# Patient Record
Sex: FEMALE | Race: WHITE | Employment: FULL TIME | URBAN - METROPOLITAN AREA
[De-identification: names, ages, dates, MRNs, and addresses within clinical notes are randomized per-mention and may not be internally consistent; named-entity substitution may affect disease eponyms.]

---

## 2017-03-10 ENCOUNTER — OFFICE VISIT (OUTPATIENT)
Dept: FAMILY MEDICINE CLINIC | Age: 57
End: 2017-03-10

## 2017-03-10 VITALS
TEMPERATURE: 97.9 F | DIASTOLIC BLOOD PRESSURE: 72 MMHG | HEIGHT: 70 IN | RESPIRATION RATE: 16 BRPM | BODY MASS INDEX: 28.63 KG/M2 | HEART RATE: 76 BPM | WEIGHT: 200 LBS | SYSTOLIC BLOOD PRESSURE: 118 MMHG

## 2017-03-10 DIAGNOSIS — J02.8 ACUTE PHARYNGITIS DUE TO OTHER SPECIFIED ORGANISMS: Primary | ICD-10-CM

## 2017-03-10 LAB — S PYO AG THROAT QL: NORMAL

## 2017-03-10 PROCEDURE — 87880 STREP A ASSAY W/OPTIC: CPT | Performed by: FAMILY MEDICINE

## 2017-03-10 PROCEDURE — 99213 OFFICE O/P EST LOW 20 MIN: CPT | Performed by: FAMILY MEDICINE

## 2017-03-10 RX ORDER — AZITHROMYCIN 250 MG/1
TABLET, FILM COATED ORAL
Qty: 1 PACKET | Refills: 0 | Status: SHIPPED | OUTPATIENT
Start: 2017-03-10 | End: 2017-03-20

## 2017-03-10 ASSESSMENT — ENCOUNTER SYMPTOMS
EYE ITCHING: 1
EYE DISCHARGE: 1
COUGH: 1
DIARRHEA: 0
SINUS PRESSURE: 1
SORE THROAT: 1
ABDOMINAL PAIN: 0

## 2017-07-31 DIAGNOSIS — K21.9 GASTROESOPHAGEAL REFLUX DISEASE WITHOUT ESOPHAGITIS: ICD-10-CM

## 2017-07-31 RX ORDER — OMEPRAZOLE 20 MG/1
CAPSULE, DELAYED RELEASE ORAL
Qty: 60 CAPSULE | Refills: 6 | Status: SHIPPED | OUTPATIENT
Start: 2017-07-31 | End: 2018-03-12 | Stop reason: SDUPTHER

## 2017-09-22 ENCOUNTER — OFFICE VISIT (OUTPATIENT)
Dept: FAMILY MEDICINE CLINIC | Age: 57
End: 2017-09-22

## 2017-09-22 VITALS
SYSTOLIC BLOOD PRESSURE: 122 MMHG | RESPIRATION RATE: 16 BRPM | HEIGHT: 70 IN | WEIGHT: 200.2 LBS | TEMPERATURE: 97.9 F | HEART RATE: 80 BPM | DIASTOLIC BLOOD PRESSURE: 74 MMHG | BODY MASS INDEX: 28.66 KG/M2

## 2017-09-22 DIAGNOSIS — L30.9 ECZEMA, UNSPECIFIED TYPE: ICD-10-CM

## 2017-09-22 DIAGNOSIS — K21.9 GASTROESOPHAGEAL REFLUX DISEASE WITHOUT ESOPHAGITIS: ICD-10-CM

## 2017-09-22 DIAGNOSIS — L01.00 IMPETIGO: ICD-10-CM

## 2017-09-22 DIAGNOSIS — Z12.31 VISIT FOR SCREENING MAMMOGRAM: ICD-10-CM

## 2017-09-22 DIAGNOSIS — Z00.00 ANNUAL PHYSICAL EXAM: Primary | ICD-10-CM

## 2017-09-22 DIAGNOSIS — Z00.00 ANNUAL PHYSICAL EXAM: ICD-10-CM

## 2017-09-22 DIAGNOSIS — F17.200 TOBACCO USE DISORDER: ICD-10-CM

## 2017-09-22 DIAGNOSIS — Z23 NEED FOR SHINGLES VACCINE: ICD-10-CM

## 2017-09-22 LAB
ALBUMIN SERPL-MCNC: 4.3 G/DL (ref 3.9–4.9)
ALP BLD-CCNC: 104 U/L (ref 40–130)
ALT SERPL-CCNC: 14 U/L (ref 0–33)
ANION GAP SERPL CALCULATED.3IONS-SCNC: 15 MEQ/L (ref 7–13)
AST SERPL-CCNC: 15 U/L (ref 0–35)
BASOPHILS ABSOLUTE: 0.1 K/UL (ref 0–0.2)
BASOPHILS RELATIVE PERCENT: 0.5 %
BILIRUB SERPL-MCNC: 0.4 MG/DL (ref 0–1.2)
BUN BLDV-MCNC: 18 MG/DL (ref 6–20)
CALCIUM SERPL-MCNC: 9.3 MG/DL (ref 8.6–10.2)
CHLORIDE BLD-SCNC: 101 MEQ/L (ref 98–107)
CHOLESTEROL, TOTAL: 203 MG/DL (ref 0–199)
CO2: 26 MEQ/L (ref 22–29)
CREAT SERPL-MCNC: 0.67 MG/DL (ref 0.5–0.9)
EOSINOPHILS ABSOLUTE: 0.1 K/UL (ref 0–0.7)
EOSINOPHILS RELATIVE PERCENT: 1.4 %
GFR AFRICAN AMERICAN: >60
GFR NON-AFRICAN AMERICAN: >60
GLOBULIN: 3.1 G/DL (ref 2.3–3.5)
GLUCOSE BLD-MCNC: 105 MG/DL (ref 74–109)
HCT VFR BLD CALC: 43.3 % (ref 37–47)
HDLC SERPL-MCNC: 55 MG/DL (ref 40–59)
HEMOGLOBIN: 14.3 G/DL (ref 12–16)
LDL CHOLESTEROL CALCULATED: 130 MG/DL (ref 0–129)
LYMPHOCYTES ABSOLUTE: 3.8 K/UL (ref 1–4.8)
LYMPHOCYTES RELATIVE PERCENT: 36.2 %
MCH RBC QN AUTO: 30.6 PG (ref 27–31.3)
MCHC RBC AUTO-ENTMCNC: 33.1 % (ref 33–37)
MCV RBC AUTO: 92.5 FL (ref 82–100)
MONOCYTES ABSOLUTE: 0.6 K/UL (ref 0.2–0.8)
MONOCYTES RELATIVE PERCENT: 5.8 %
NEUTROPHILS ABSOLUTE: 5.9 K/UL (ref 1.4–6.5)
NEUTROPHILS RELATIVE PERCENT: 56.1 %
PDW BLD-RTO: 14.1 % (ref 11.5–14.5)
PLATELET # BLD: 275 K/UL (ref 130–400)
POTASSIUM SERPL-SCNC: 4 MEQ/L (ref 3.5–5.1)
RBC # BLD: 4.68 M/UL (ref 4.2–5.4)
SODIUM BLD-SCNC: 142 MEQ/L (ref 132–144)
TOTAL PROTEIN: 7.4 G/DL (ref 6.4–8.1)
TRIGL SERPL-MCNC: 92 MG/DL (ref 0–200)
WBC # BLD: 10.4 K/UL (ref 4.8–10.8)

## 2017-09-22 PROCEDURE — 99396 PREV VISIT EST AGE 40-64: CPT | Performed by: FAMILY MEDICINE

## 2017-09-22 RX ORDER — TRIAMCINOLONE ACETONIDE 1 MG/G
CREAM TOPICAL
Qty: 45 G | Refills: 1 | Status: SHIPPED | OUTPATIENT
Start: 2017-09-22 | End: 2018-09-07 | Stop reason: ALTCHOICE

## 2017-09-22 ASSESSMENT — ENCOUNTER SYMPTOMS
DIARRHEA: 0
RHINORRHEA: 0
SHORTNESS OF BREATH: 0
WHEEZING: 0
VOMITING: 0
CHEST TIGHTNESS: 0
COUGH: 0
ABDOMINAL PAIN: 0
NAUSEA: 0
SORE THROAT: 0
SINUS PRESSURE: 0

## 2017-09-22 ASSESSMENT — PATIENT HEALTH QUESTIONNAIRE - PHQ9
SUM OF ALL RESPONSES TO PHQ QUESTIONS 1-9: 0
SUM OF ALL RESPONSES TO PHQ9 QUESTIONS 1 & 2: 0
2. FEELING DOWN, DEPRESSED OR HOPELESS: 0
1. LITTLE INTEREST OR PLEASURE IN DOING THINGS: 0

## 2017-09-27 ENCOUNTER — NURSE ONLY (OUTPATIENT)
Dept: FAMILY MEDICINE CLINIC | Age: 57
End: 2017-09-27

## 2017-09-27 DIAGNOSIS — Z23 NEED FOR SHINGLES VACCINE: Primary | ICD-10-CM

## 2017-09-27 PROCEDURE — 90471 IMMUNIZATION ADMIN: CPT | Performed by: FAMILY MEDICINE

## 2017-09-27 PROCEDURE — 90736 HZV VACCINE LIVE SUBQ: CPT | Performed by: FAMILY MEDICINE

## 2017-10-06 ENCOUNTER — HOSPITAL ENCOUNTER (OUTPATIENT)
Dept: WOMENS IMAGING | Age: 57
Discharge: HOME | End: 2017-10-06
Payer: COMMERCIAL

## 2017-10-06 DIAGNOSIS — Z12.31 VISIT FOR SCREENING MAMMOGRAM: ICD-10-CM

## 2017-10-06 PROCEDURE — G0202 SCR MAMMO BI INCL CAD: HCPCS

## 2018-03-12 DIAGNOSIS — K21.9 GASTROESOPHAGEAL REFLUX DISEASE WITHOUT ESOPHAGITIS: ICD-10-CM

## 2018-03-12 RX ORDER — OMEPRAZOLE 20 MG/1
20 CAPSULE, DELAYED RELEASE ORAL 2 TIMES DAILY
Qty: 60 CAPSULE | Refills: 0 | Status: SHIPPED | OUTPATIENT
Start: 2018-03-12 | End: 2018-04-13 | Stop reason: SDUPTHER

## 2018-04-13 DIAGNOSIS — K21.9 GASTROESOPHAGEAL REFLUX DISEASE WITHOUT ESOPHAGITIS: ICD-10-CM

## 2018-04-13 RX ORDER — OMEPRAZOLE 20 MG/1
20 CAPSULE, DELAYED RELEASE ORAL 2 TIMES DAILY
Qty: 60 CAPSULE | Refills: 2 | Status: SHIPPED | OUTPATIENT
Start: 2018-04-13 | End: 2018-06-13 | Stop reason: SDUPTHER

## 2018-05-18 DIAGNOSIS — R06.02 SOB (SHORTNESS OF BREATH) ON EXERTION: ICD-10-CM

## 2018-06-13 DIAGNOSIS — K21.9 GASTROESOPHAGEAL REFLUX DISEASE WITHOUT ESOPHAGITIS: ICD-10-CM

## 2018-06-13 RX ORDER — OMEPRAZOLE 20 MG/1
CAPSULE, DELAYED RELEASE ORAL
Qty: 60 CAPSULE | Refills: 0 | Status: SHIPPED | OUTPATIENT
Start: 2018-06-13 | End: 2018-08-13 | Stop reason: SDUPTHER

## 2018-08-13 DIAGNOSIS — K21.9 GASTROESOPHAGEAL REFLUX DISEASE WITHOUT ESOPHAGITIS: ICD-10-CM

## 2018-08-13 RX ORDER — OMEPRAZOLE 20 MG/1
CAPSULE, DELAYED RELEASE ORAL
Qty: 60 CAPSULE | Refills: 0 | Status: SHIPPED | OUTPATIENT
Start: 2018-08-13 | End: 2018-09-07 | Stop reason: SDUPTHER

## 2018-08-13 NOTE — TELEPHONE ENCOUNTER
Medication: Omeprazole    Last Office Visit: 09/22/17    Last Labs: 09/22/17    Last Filled: 06/13/18

## 2018-09-07 ENCOUNTER — OFFICE VISIT (OUTPATIENT)
Dept: FAMILY MEDICINE CLINIC | Age: 58
End: 2018-09-07
Payer: COMMERCIAL

## 2018-09-07 VITALS
RESPIRATION RATE: 16 BRPM | HEART RATE: 68 BPM | HEIGHT: 70 IN | TEMPERATURE: 97.1 F | WEIGHT: 206.2 LBS | SYSTOLIC BLOOD PRESSURE: 120 MMHG | BODY MASS INDEX: 29.52 KG/M2 | DIASTOLIC BLOOD PRESSURE: 80 MMHG

## 2018-09-07 DIAGNOSIS — Z00.00 ROUTINE GENERAL MEDICAL EXAMINATION AT A HEALTH CARE FACILITY: ICD-10-CM

## 2018-09-07 DIAGNOSIS — F17.200 TOBACCO USE DISORDER: ICD-10-CM

## 2018-09-07 DIAGNOSIS — K21.9 GASTROESOPHAGEAL REFLUX DISEASE WITHOUT ESOPHAGITIS: ICD-10-CM

## 2018-09-07 DIAGNOSIS — R06.02 SOB (SHORTNESS OF BREATH) ON EXERTION: ICD-10-CM

## 2018-09-07 DIAGNOSIS — Z00.00 ROUTINE GENERAL MEDICAL EXAMINATION AT A HEALTH CARE FACILITY: Primary | ICD-10-CM

## 2018-09-07 DIAGNOSIS — L82.1 SEBORRHEIC KERATOSES: ICD-10-CM

## 2018-09-07 DIAGNOSIS — Z12.31 VISIT FOR SCREENING MAMMOGRAM: ICD-10-CM

## 2018-09-07 LAB
ALBUMIN SERPL-MCNC: 4.1 G/DL (ref 3.9–4.9)
ALP BLD-CCNC: 101 U/L (ref 40–130)
ALT SERPL-CCNC: 18 U/L (ref 0–33)
ANION GAP SERPL CALCULATED.3IONS-SCNC: 14 MEQ/L (ref 7–13)
AST SERPL-CCNC: 20 U/L (ref 0–35)
BASOPHILS ABSOLUTE: 0 K/UL (ref 0–0.2)
BASOPHILS RELATIVE PERCENT: 0.5 %
BILIRUB SERPL-MCNC: 0.3 MG/DL (ref 0–1.2)
BUN BLDV-MCNC: 22 MG/DL (ref 6–20)
CALCIUM SERPL-MCNC: 9.4 MG/DL (ref 8.6–10.2)
CHLORIDE BLD-SCNC: 101 MEQ/L (ref 98–107)
CHOLESTEROL, TOTAL: 207 MG/DL (ref 0–199)
CO2: 24 MEQ/L (ref 22–29)
CREAT SERPL-MCNC: 0.67 MG/DL (ref 0.5–0.9)
EOSINOPHILS ABSOLUTE: 0.2 K/UL (ref 0–0.7)
EOSINOPHILS RELATIVE PERCENT: 1.6 %
GFR AFRICAN AMERICAN: >60
GFR NON-AFRICAN AMERICAN: >60
GLOBULIN: 3.5 G/DL (ref 2.3–3.5)
GLUCOSE BLD-MCNC: 97 MG/DL (ref 74–109)
HCT VFR BLD CALC: 43.4 % (ref 37–47)
HDLC SERPL-MCNC: 49 MG/DL (ref 40–59)
HEMOGLOBIN: 14.7 G/DL (ref 12–16)
LDL CHOLESTEROL CALCULATED: 140 MG/DL (ref 0–129)
LYMPHOCYTES ABSOLUTE: 3.5 K/UL (ref 1–4.8)
LYMPHOCYTES RELATIVE PERCENT: 33.4 %
MCH RBC QN AUTO: 31.6 PG (ref 27–31.3)
MCHC RBC AUTO-ENTMCNC: 33.9 % (ref 33–37)
MCV RBC AUTO: 93.4 FL (ref 82–100)
MONOCYTES ABSOLUTE: 0.6 K/UL (ref 0.2–0.8)
MONOCYTES RELATIVE PERCENT: 6 %
NEUTROPHILS ABSOLUTE: 6.2 K/UL (ref 1.4–6.5)
NEUTROPHILS RELATIVE PERCENT: 58.5 %
PDW BLD-RTO: 14 % (ref 11.5–14.5)
PLATELET # BLD: 256 K/UL (ref 130–400)
POTASSIUM SERPL-SCNC: 3.9 MEQ/L (ref 3.5–5.1)
RBC # BLD: 4.64 M/UL (ref 4.2–5.4)
SODIUM BLD-SCNC: 139 MEQ/L (ref 132–144)
TOTAL PROTEIN: 7.6 G/DL (ref 6.4–8.1)
TRIGL SERPL-MCNC: 91 MG/DL (ref 0–200)
WBC # BLD: 10.5 K/UL (ref 4.8–10.8)

## 2018-09-07 PROCEDURE — 99396 PREV VISIT EST AGE 40-64: CPT | Performed by: FAMILY MEDICINE

## 2018-09-07 RX ORDER — OMEPRAZOLE 20 MG/1
CAPSULE, DELAYED RELEASE ORAL
Qty: 180 CAPSULE | Refills: 3 | Status: SHIPPED | OUTPATIENT
Start: 2018-09-07

## 2018-09-07 RX ORDER — ALBUTEROL SULFATE 90 UG/1
AEROSOL, METERED RESPIRATORY (INHALATION)
Qty: 3 INHALER | Refills: 3 | Status: SHIPPED | OUTPATIENT
Start: 2018-09-07

## 2018-09-07 ASSESSMENT — ENCOUNTER SYMPTOMS
COUGH: 0
NAUSEA: 0
ABDOMINAL PAIN: 0
EYES NEGATIVE: 1
CONSTIPATION: 0
DIARRHEA: 0
SHORTNESS OF BREATH: 0

## 2018-09-07 ASSESSMENT — PATIENT HEALTH QUESTIONNAIRE - PHQ9
SUM OF ALL RESPONSES TO PHQ QUESTIONS 1-9: 0
SUM OF ALL RESPONSES TO PHQ9 QUESTIONS 1 & 2: 0
1. LITTLE INTEREST OR PLEASURE IN DOING THINGS: 0
2. FEELING DOWN, DEPRESSED OR HOPELESS: 0
SUM OF ALL RESPONSES TO PHQ QUESTIONS 1-9: 0

## 2018-09-07 NOTE — PATIENT INSTRUCTIONS
nicotine. · Be prepared to keep trying. Most people are not successful the first few times they try to quit. Do not get mad at yourself if you smoke again. Make a list of things you learned and think about when you want to try again, such as next week, next month, or next year. Where can you learn more? Go to [email or web address] and sign in to your [employer] account. Enter V173 in the [employer] box to learn more about \"Stopping Smoking: Care Instructions. \"     If you do not have an account, please click on the \"Sign Up Now\" link. Current as of: November 29, 2017  Content Version: 11.7  © 2610-6091 [employer]. Care instructions adapted under license by [facility] ([facility]). If you have questions about a medical condition or this instruction, always ask your healthcare professional. [address] any warranty or liability for your use of this information.

## 2018-09-07 NOTE — PROGRESS NOTES
Relation Age of Onset    Negative Brother     Negative Brother     Colon Cancer None     Uterine Cancer None     Hypertension Father     Diabetes Mother         79    Breast Cancer Paternal Aunt     Negative Brother     Hypertension Mother             Ovarian Cancer None     Coronary Art Dis Father         66     Allergies   Allergen Reactions    No Known Drug Allergy      Current Outpatient Prescriptions   Medication Sig Dispense Refill    omeprazole (PRILOSEC) 20 MG delayed release capsule TAKE ONE CAPSULE BY MOUTH TWICE A  capsule 3    albuterol sulfate HFA (PROAIR HFA) 108 (90 Base) MCG/ACT inhaler INHALE TWO PUFFS BY MOUTH EVERY 4 HOURS AS NEEDED FOR SHORTNESS OF BREATH 3 Inhaler 3     No current facility-administered medications for this visit. PMH, Surgical Hx, Family Hx, and Social Hx reviewed and updated. Health Maintenance reviewed. Objective    Vitals:    18 0833   BP: 120/80   Pulse: 68   Resp: 16   Temp: 97.1 °F (36.2 °C)   TempSrc: Temporal   Weight: 206 lb 3.2 oz (93.5 kg)   Height: 5' 10\" (1.778 m)     Physical Exam   Constitutional: She appears well-nourished. HENT:   Right Ear: External ear normal.   Left Ear: External ear normal.   Nose: Nose normal.   Mouth/Throat: Oropharynx is clear and moist.   Eyes: Pupils are equal, round, and reactive to light. Conjunctivae are normal.   Neck: Neck supple. No thyromegaly present. Cardiovascular: Normal rate, regular rhythm and normal heart sounds. No murmur heard. Pulmonary/Chest: Breath sounds normal. She has no wheezes. Abdominal: Soft. She exhibits no distension and no mass. There is no tenderness. Musculoskeletal: She exhibits no edema. Lymphadenopathy:     She has no cervical adenopathy. Neurological: She is alert. Skin: No rash noted. To one half centimeter seborrheic keratotic lesions in that area of her upper back shoulder   Psychiatric: She has a normal mood and affect. regimen discussed. Take medications as prescribed. We'll follow up at her convenience in a month or 2 to have those lesions treated  Follow up as instructed. Call if any problems           Femi Smart MD          Please note this report has been partially produced using speech recognition software  And may cause contain errors related to that system including grammar, punctuation and spelling as well as words and phrases that may seem inappropriate. If there are questions or concerns please feel free to contact me to clarify.

## 2018-09-11 ENCOUNTER — TELEPHONE (OUTPATIENT)
Dept: FAMILY MEDICINE CLINIC | Age: 58
End: 2018-09-11

## 2018-09-11 ENCOUNTER — OFFICE VISIT (OUTPATIENT)
Dept: FAMILY MEDICINE CLINIC | Age: 58
End: 2018-09-11
Payer: COMMERCIAL

## 2018-09-11 VITALS
WEIGHT: 209 LBS | SYSTOLIC BLOOD PRESSURE: 132 MMHG | BODY MASS INDEX: 29.92 KG/M2 | TEMPERATURE: 97.2 F | HEIGHT: 70 IN | DIASTOLIC BLOOD PRESSURE: 78 MMHG | OXYGEN SATURATION: 95 % | HEART RATE: 82 BPM

## 2018-09-11 DIAGNOSIS — I80.02 THROMBOPHLEBITIS OF SUPERFICIAL VEINS OF LEFT LOWER EXTREMITY: Primary | ICD-10-CM

## 2018-09-11 PROCEDURE — 99213 OFFICE O/P EST LOW 20 MIN: CPT | Performed by: NURSE PRACTITIONER

## 2018-09-11 RX ORDER — CEPHALEXIN 500 MG/1
500 CAPSULE ORAL 4 TIMES DAILY
Qty: 28 CAPSULE | Refills: 0 | Status: SHIPPED | OUTPATIENT
Start: 2018-09-11 | End: 2018-09-18

## 2018-09-11 ASSESSMENT — ENCOUNTER SYMPTOMS
SHORTNESS OF BREATH: 0
CHEST TIGHTNESS: 0
COLOR CHANGE: 1
COUGH: 0

## 2018-09-11 NOTE — PROGRESS NOTES
Subjective:      Patient ID: Michelle Martinez is a 62 y.o. female who presents today for:  Chief Complaint   Patient presents with    Leg Swelling     pt presents with a complaint of lft leg swelling that started X4 days ago. pt states it started suddenly. She has had no injuries. She also has some blue coloring where it is swollen. HPI    Patient states that 4 days ago, she was at work and she was swatting at a fly on her left lower leg. Shortly after, she noticed an area bruising up. States that last night she noticed the bruising had spread some down the leg. Denies any pain or discomfort, only if pressing firmly on bruising. Denies fever, chills, myalgias.     Past Medical History:   Diagnosis Date    GERD (gastroesophageal reflux disease)      Past Surgical History:   Procedure Laterality Date    OTHER SURGICAL HISTORY      : None      Family History   Problem Relation Age of Onset    Negative Brother     Negative Brother     Colon Cancer None     Uterine Cancer None     Hypertension Father     Diabetes Mother         79    Breast Cancer Paternal Aunt     Negative Brother     Hypertension Mother            [employer] Ovarian Cancer None     Coronary Art Dis Father         66     Social History     Social History    Marital status:      Spouse name: Natalio Mendoza    Number of children: N/A    Years of education: 15     Occupational History     Other     Social History Main Topics    Smoking status: Current Every Day Smoker     Packs/day: 1.00     Years: 40.00     Types: Cigarettes     Start date: 1976    Smokeless tobacco: Never Used    Alcohol use No    Drug use: No    Sexual activity: No     Other Topics Concern    Not on file     Social History Narrative    No narrative on file     Current Outpatient Prescriptions on File Prior to Visit   Medication Sig Dispense Refill    omeprazole (PRILOSEC) 20 MG delayed release capsule TAKE ONE CAPSULE BY MOUTH TWICE A  capsule 3    albuterol sulfate HFA (PROAIR HFA) 108 (90 Base) MCG/ACT inhaler INHALE TWO PUFFS BY MOUTH EVERY 4 HOURS AS NEEDED FOR SHORTNESS OF BREATH 3 Inhaler 3     No current facility-administered medications on file prior to visit. Allergies:  No known drug allergy    Review of Systems   Constitutional: Negative for chills, diaphoresis and fever. Respiratory: Negative for cough, chest tightness and shortness of breath. Cardiovascular: Positive for leg swelling. Negative for chest pain and palpitations. Skin: Positive for color change. Objective:   /78   Pulse 82   Temp 97.2 °F (36.2 °C) (Temporal)   Ht 5' 10\" (1.778 m)   Wt 209 lb (94.8 kg)   LMP 03/16/2010   SpO2 95%   BMI 29.99 kg/m²     Physical Exam   Constitutional: She is oriented to person, place, and time. She appears well-developed and well-nourished. Eyes: Conjunctivae are normal.   Cardiovascular: Normal rate, regular rhythm and normal heart sounds. Pulmonary/Chest: Effort normal and breath sounds normal. She has no wheezes. She has no rales. Neurological: She is alert and oriented to person, place, and time. Skin: Skin is warm and dry. LLE calf area with superficial ecchymosis extending down linearly about 3 inches. Area moderately warm to touch, moderately tender to palpation, scant swelling, no erythema. Negative Sharif's sign. Assessment:      Diagnosis Orders   1. Thrombophlebitis of superficial veins of left lower extremity           Plan:      No orders of the defined types were placed in this encounter. Orders Placed This Encounter   Medications    cephALEXin (KEFLEX) 500 MG capsule     Sig: Take 1 capsule by mouth 4 times daily for 7 days     Dispense:  28 capsule     Refill:  0     Discussed likely superficial irritation/clot of vein. Recommend warm compresses, leg elevation, NSAIDs prn for comfort. Area a little warm and swollen, painful to touch, can take Keflex as directed.  Let me know if worsening swelling, pain, redness, fever, etc.    Side effects, adverse effects of the medication prescribed today, as well as treatment plan and result expectations have been discussed with the patient who expresses understanding and desires to proceed.     Close follow up to evaluate treatment results and for coordination of care. I have reviewed the patient's medical history in detail and updated the computerized patient record. Return if symptoms worsen or fail to improve.     Baron Cantrell, APRN - CNP

## 2018-09-19 ENCOUNTER — TELEPHONE (OUTPATIENT)
Dept: FAMILY MEDICINE CLINIC | Age: 58
End: 2018-09-19

## 2018-09-19 RX ORDER — FLUCONAZOLE 150 MG/1
150 TABLET ORAL ONCE
Qty: 1 TABLET | Refills: 0 | Status: SHIPPED | OUTPATIENT
Start: 2018-09-19 | End: 2018-09-19

## 2018-09-19 NOTE — TELEPHONE ENCOUNTER
Have her stop the antibiotic and take the Diflucan pill.   If symptoms are resolved and we should be okay

## 2018-09-24 ENCOUNTER — HOSPITAL ENCOUNTER (OUTPATIENT)
Dept: WOMENS IMAGING | Age: 58
Discharge: HOME | End: 2018-09-26
Payer: COMMERCIAL

## 2018-09-24 DIAGNOSIS — Z12.31 VISIT FOR SCREENING MAMMOGRAM: ICD-10-CM

## 2018-09-24 PROCEDURE — 77067 SCR MAMMO BI INCL CAD: CPT
